# Patient Record
Sex: FEMALE | Race: WHITE
[De-identification: names, ages, dates, MRNs, and addresses within clinical notes are randomized per-mention and may not be internally consistent; named-entity substitution may affect disease eponyms.]

---

## 2017-05-22 ENCOUNTER — HOSPITAL ENCOUNTER (OUTPATIENT)
Dept: HOSPITAL 84 - D.MAMMO | Age: 77
End: 2017-05-22
Attending: INTERNAL MEDICINE
Payer: MEDICARE

## 2017-05-22 DIAGNOSIS — Z12.31: Primary | ICD-10-CM

## 2018-05-29 ENCOUNTER — HOSPITAL ENCOUNTER (OUTPATIENT)
Dept: HOSPITAL 84 - D.MAMMO | Age: 78
Discharge: HOME | End: 2018-05-29
Attending: INTERNAL MEDICINE
Payer: MEDICARE

## 2018-05-29 DIAGNOSIS — Z12.31: Primary | ICD-10-CM

## 2019-12-16 ENCOUNTER — HOSPITAL ENCOUNTER (OUTPATIENT)
Dept: HOSPITAL 84 - D.CT | Age: 79
Discharge: HOME | End: 2019-12-16
Attending: INTERNAL MEDICINE
Payer: MEDICARE

## 2019-12-16 DIAGNOSIS — R01.1: ICD-10-CM

## 2019-12-16 DIAGNOSIS — I70.213: Primary | ICD-10-CM

## 2019-12-16 DIAGNOSIS — R60.0: ICD-10-CM

## 2019-12-18 NOTE — EC
PATIENT:MTAILDE BRADY               DATE OF SERVICE: 12/16/19
SEX: F                                  MEDICAL RECORD: [id number]
DATE OF BIRTH: [birth year]                        LOCATION:D.CT           
AGE OF PATIENT: 79                             ADMISSION DATE: 12/16/19
 
REFERRING PHYSICIAN:                               
 
INTERPRETING PHYSICIAN: CARLOS ROCHA MD          
 
 
 
                             ECHOCARDIOGRAM REPORT
  ECHO CHARGES 4               ECHO COMPLETE                 Date: 12/16/19
 
 
 
CLINICAL DIAGNOSIS: HEART MURMUR                  
 
                         ECHOCARDIOGRAPHIC MEASUREMENTS
      (adult normal given)
   AC root (d.<3.7cm) 3.3  cm   LV Septum d (<1.2 cm> 1.3  cm
      Valve Excursion 1.6  cm     LV Septum (systole) 1.6  cm
Left Atria (s.<4.0cm> 3.7  cm          LVPW d(<1.2cm) 1.6  cm
        RV (d.<2.3cm) 3.7  cm           LVPW (sytole) 1.9  cm
  LV diastole(<5.6CM) 4.1  cm       MV E-F(>70mm/sec)      cm
           LV systole 2.5  cm           LVOT Diameter 1.6  cm
       MV exc.(>10mm) 1.1  cm
Est.ejection fraction (50-75%)     %
 
   DOPPLER:
     LVIT      cm/sec A 91.0 cm/sec E 59.0  cm/sec
       LA      cm/sec      RVSP 28   mmHg
     LVOT 89   cm/sec   AOP1/2T      m/s
  Asc. Ao 120  cm/sec
     RVOT 57   cm/sec
       RA      cm/sec
       PA 89   cm/sec
 AV Gradient Peak 5.72 mmHg  AV Mean 3.15 mmHg  AV Area 1.9  cm
 MV Gradient Peak 4.76 mmHg  MV Mean 2.10 mmHg  MV Area      cm
   COMMENTS:                                              
 
 
 Cardiac Sonographer: 2               MALINA LORENZ              
      Cardiologist: 3          Dr. Collins             
             TAPE# PACS           
                                       Pericardial Effusion N                        
 
 
DATE OF SERVICE:  
 
Adequate 2-D echo, Color-Flow and Spectral Doppler, and M-mode
 
LVH is present.  LV internal dimension is normal.  Wall motion is normal.  EF is
greater than or equal to 55%.  Aortic valve is tricuspid.  No evidence of
stenosis by Doppler interrogation.  Left atrium is normal.  Mitral valve shows
no prolapse.  Trace MR.  Right-sided chambers are grossly normal.  Trace TR.
 
TRANSINT:[id number] Voice Confirmation ID: [id number] DOCUMENT ID: [id number]
 
 
 
ECHOCARDIOGRAM REPORT                          [id number]    MATILDE BRADY,CARLOS ZAMBRANO MD          
 
 
 
Electronically Signed by CARLOS ROCHA on 12/18/19 at 0854
 
 
 
 
 
 
 
 
 
 
 
 
 
 
 
 
 
 
 
 
 
 
 
 
 
 
 
 
 
 
 
 
 
 
 
 
 
 
 
 
CC:                                                             [id number]
DICTATION DATE: 12/17/19 1311     :     12/17/19 1913      DEP CLI 
                                                                      12/16/19
[facility]                                          
[address] [place] [address]

## 2020-01-06 ENCOUNTER — HOSPITAL ENCOUNTER (OUTPATIENT)
Dept: HOSPITAL 84 - D.CATH | Age: 80
Discharge: HOME | End: 2020-01-06
Attending: INTERNAL MEDICINE
Payer: MEDICARE

## 2020-01-06 VITALS — SYSTOLIC BLOOD PRESSURE: 139 MMHG | DIASTOLIC BLOOD PRESSURE: 79 MMHG

## 2020-01-06 VITALS — BODY MASS INDEX: 22.13 KG/M2 | WEIGHT: 120.25 LBS | HEIGHT: 62 IN

## 2020-01-06 DIAGNOSIS — I10: ICD-10-CM

## 2020-01-06 DIAGNOSIS — I73.9: Primary | ICD-10-CM

## 2020-01-06 DIAGNOSIS — E78.5: ICD-10-CM

## 2020-01-06 LAB
ANION GAP SERPL CALC-SCNC: 11.4 MMOL/L (ref 8–16)
BASOPHILS NFR BLD AUTO: 0.3 % (ref 0–2)
BUN SERPL-MCNC: 23 MG/DL (ref 7–18)
CALCIUM SERPL-MCNC: 8.8 MG/DL (ref 8.5–10.1)
CHLORIDE SERPL-SCNC: 103 MMOL/L (ref 98–107)
CO2 SERPL-SCNC: 31.7 MMOL/L (ref 21–32)
CREAT SERPL-MCNC: 0.6 MG/DL (ref 0.6–1.3)
EOSINOPHIL NFR BLD: 1.4 % (ref 0–7)
ERYTHROCYTE [DISTWIDTH] IN BLOOD BY AUTOMATED COUNT: 12 % (ref 11.5–14.5)
GLUCOSE SERPL-MCNC: 93 MG/DL (ref 74–106)
HCT VFR BLD CALC: 41.6 % (ref 36–48)
HGB BLD-MCNC: 14.7 G/DL (ref 12–16)
IMM GRANULOCYTES NFR BLD: 0.5 % (ref 0–5)
LYMPHOCYTES NFR BLD AUTO: 21.8 % (ref 15–50)
MCH RBC QN AUTO: 33.6 PG (ref 26–34)
MCHC RBC AUTO-ENTMCNC: 35.3 G/DL (ref 31–37)
MCV RBC: 95.2 FL (ref 80–100)
MONOCYTES NFR BLD: 9.9 % (ref 2–11)
NEUTROPHILS NFR BLD AUTO: 66.1 % (ref 40–80)
OSMOLALITY SERPL CALC.SUM OF ELEC: 288 MOSM/KG (ref 275–300)
PLATELET # BLD: 209 10X3/UL (ref 130–400)
PMV BLD AUTO: 9.3 FL (ref 7.4–10.4)
POTASSIUM SERPL-SCNC: 3.1 MMOL/L (ref 3.5–5.1)
RBC # BLD AUTO: 4.37 10X6/UL (ref 4–5.4)
SODIUM SERPL-SCNC: 143 MMOL/L (ref 136–145)
WBC # BLD AUTO: 6.6 10X3/UL (ref 4.8–10.8)

## 2020-01-06 NOTE — NUR
RIGHT GROIN DRESSING C/D/I. NO S/S OF HEMATOMA NOTED. PT ON BEDPAN.
VOIDED APPROX 150cc OF YELLOW URINE. PT REFUSED TO LAY FLAT. SHE
STATES "I CAN'T PEE LAYING FLAT!!" AND PROCEEDED TO PROP HERSELF UP
ON HER ELBOWS TO VOID. I INSTRUCTED HER ON THE NEED TO CONTINUE TO
LAY FLAT, BUT SHE REFUSED. EVAN-CARE GIVEN AND RIGHT GROIN CHECKED
AFTER PT HAD SAT UP SLIGHTLY. DRESSING WAS C/D/I. NO S/S OF HEMATOMA
NOTED. SHE STATES THAT HER BACK HAS STARTED HURTING AND REPORTS ITS
HER CHRONIC PAIN AND GIVES IT A 8/10 ON PAIN SCALE. WILL GIVE PAIN
MEDICATION AS ORDERED AND CONTINUE TO MONITOR.

## 2020-01-06 NOTE — NUR
PT SET UP WITH DRINK AND SANDWICH. ABLE TO FEED SELF. DENIES NAUSEA.
AWAKE AND ALERT AT THIS TIME. PT IN [place] POSITION BUT BED IS IN
REVERSE [place] POSITION AT THIS TIME. VSS. CALL LIGHT WITHIN
REACH. PT WATCHING TELEVISION.

## 2020-01-06 NOTE — NUR
RIGHT GROIN DRESSING C/D/I. NO S/S OF HEMATOMA NOTED. PIV D/C'D WITH
CATH TIP INTACT. TOLERATED WELL. PT UP AND DRESSED WITH MINIMAL
ASSISTANCE.

## 2020-01-06 NOTE — NUR
RIGHT GROIN DRESSING C/D/I. NO S/S OF HEMATOMA NOTED. CALL LIGHT
WITHIN REACH. VSS AT THIS TIME. PT DENIES NAUSEA/PAIN.

## 2020-01-06 NOTE — HEMODYNAMI
PATIENT:MATILDE BRADY                                MEDICAL RECORD: [id number]
: 36                                            [facility]ATION:[facility]          
ACCT# [id number]                                       ADMISSION DATE: 20
 
 
 Generatedon:202015:03
Patient name: MATILDE BRADY Patient #: [id number] Visit #: [id number] SSN: YOB: 1940 Date
of study: 2020
Page: Of
Hemodynamic Procedure Report
****************************
Patient Data
Patient Demographics
Procedure consent was obtained
First Name: MATILDE         Gender: Female
Last Name: CAITLYN          : 1940
Middle Initial: J           Age: 79 year(s)
Patient #: [id number]       Race: Unknown
Visit #: [id number]
Accession #:
[id number]
Additional ID: [id number]
Contact details
Address: [address] [place] [address]    Phone: [phone]
STREET
State: [place]
City: [place]
Zip code: [address]
Past Medical History
Allergies
Allergen        Reaction        Date         Comments
Reported
Other allergy                   2020     CORTISONE
Admission
Admission Data
Admission Date: 2020    Admission Time: 11:29
Height (in.): 62            BSA: 1.54 (m2)
Height (cm.): 157.48        BMI: 22.13 (kg/m2)
Weight (lbs.): 121
Weight (kg.): 54.88
Lab Results
Lab Result Date: 2020   Lab Result Time: 0:00
Biochemistry
Name         Units    Result                Min      Max
BUN          mg/dl    23       --(----)-*   7        18
Creatinine   mg/dl    0.6      --(*---)--   0.6      1.3
eGFR         ml/min   90       --(*---)--   90       120
NONAFRICAN
CBC
Name         Units    Result                Min      Max
Hematocrit   %        41.6     -*(----)--   42       54
Hemoglobin   g/dl     14.7     --(-*--)--   13.5     17.5
Procedure
Procedure Types
Cath Procedure
Diagnostic Procedure
Sedation Charges
Moderate Sedation up to 45 minutes
 
PCI Procedure
Hemochron ACT Test
Peripheral Cath Diagnostic Procedure
Cath Lab Peripheral Procedures
AFRO (Diagnostic)
Peripheral vascular Intervention
Stent
Stent-Fem/Popw/plasty
Procedure Description
Procedure Date
Procedure Date: 2020
Procedure Start Time: 14:05
Procedure End Time: 15:01
Procedure Staff
Name                            Function
Espinoza Jimenes MD              Performing Physician
Lisette Nugent RN                  Nurse
Diya Hanna RT              Scrub
Verónica Houtson RT                Monitor
Brissanay Vanegas RT               Monitor
Procedure Data
Cath Procedure
Fluoroscopy
Diagnostic fluoroscopy      Total fluoroscopy Time:
time: 11.7 min              11.7 min
Diagnostic fluoroscopy      Total fluoroscopy dose: 208
dose: 208 mGy               mGy
Contrast Material
Contrast Material Type                       Amount (ml)
Isovue 370                                   113
Entry Location
Entry     Primary  Successful  Side  Size  Upsize Upsize Entry    Closure Succes
sful  Closure
Location                             (Fr)  1 (Fr) 2 (Fr) Remarks  Device        
      Remarks
Femoral                        Right 5 Fr  6 Fr                   Exoseal
artery                                     Long
Estimated blood loss: 10 ml
Diagnostic catheters
Device Type               Used For           End Catheter
Placement
DIAGNOSTIC UF 5Fr         Procedure
catheter ([id number])
Procedure Complications
No complications
Procedure Medications
Medication           Administration Route Dosage
Oxygen               etCO2 Nasal cannula  2 l/min
Lidocaine 2%         added to field       20
Heparin Flush Bag    added to field       2 bags
(1000units/500ml NS)
0.9% NaCl            I.V.                 100 ml/hr
Versed               I.V.                 2 mg
Fentanyl             I.V.                 50 mcg
Versed               I.V.                 1 mg
Fentanyl             I.V.                 50 mcg
Heparin Bolus        I.V.                 5000 units
Versed               I.V.                 1 mg
Fentanyl             I.V.                 100 mcg
Versed               I.V.                 2 mg
 
Fentanyl             I.V.                 100 mcg
Heparin Bolus        I.V.                 2000 units
Hemodynamics
Rest
BSA: 1.54 (m2) HGB: 14.7 (g/dl) O2 Consumption: Estimated: 136.35 (ml/min) O2 Co
nsumption indexed:
Estimated:88.54 (ml/min/m) Heart Rate: 67 (bpm)
Snapshots
Pre Cath      Intra         NCS           Post Cath
Vital Signs
Time     Heart  Resp   SPO2 etCO2   NIBP (mmHg) Rhythm  Pain    Sedation
Rate   (ipm)  (%)  (mmHg)                      Status  Level
(bpm)
13:50:06 73     18     97   0       170/94(123) NSR     0 (11)  10(A)
, No
pain
13:54:28 68     14     97   27.7    145/79(107) NSR     0 (11)  10(A)
, No
pain
13:58:44 68     18     96   0       134/74(117) NSR     0 (11)  10(A)
, No
pain
14:03:00 72     15     93   0       131/68(89)  NSR     0 (11)  10(A)
, No
pain
14:07:16 72     19     100  24.7    138/66(111) NSR     0 (11)  10(A)
, No
pain
14:11:32 78     17     94   0       125/63(102) NSR     0 (11)  9(A)
, No
pain
14:15:44 78     17     97   0       127/66(97)  NSR     0 (11)  9(A)
, No
pain
14:19:58 80     18     98   0       130/65(85)  NSR     0 (11)  9(A)
, No
pain
14:24:14 79     19     96   0       126/64(106) NSR     0 (11)  9(A)
, No
pain
14:28:24 81     20     100  41.9    135/78(93)  NSR     0 (11)  10(A)
, No
pain
14:32:40 78     17     100  12.7    128/69(91)  NSR     0 (11)  10(A)
, No
pain
14:36:52 77     11     100  29.9    123/69(94)  NSR     0 (11)  10(A)
, No
pain
14:41:02 78     17     100  0       132/69(90)  NSR     0 (11)  10(A)
, No
pain
14:45:18 77     15     100  0       150/66(95)  NSR     0 (11)  10(A)
, No
pain
14:49:38 77     16     98   0       123/61(82)  NSR     0 (11)  10(A)
, No
pain
14:53:48 81     20     99   24.7    136/69(113) NSR     0 (11)  10(A)
, No
 
pain
14:58:00 82     14     99   44.2    138/81(105) NSR     0 (11)  10(A)
, No
pain
15:02:08 83     25     93   9.7     153/92(111) NSR     0 (11)  10(A)
, No
pain
Medications
Time     Medication       Route   Dose  Verified Delivered Reason          Notes
    Effectiveness
by       by
13:50:31 Oxygen           etCO2   2     Espinoza Knox    used for
Nasal   l/min St Sunny Nugent RN   procedure
cannula       MD
13:56:41 Lidocaine 2%     added   20ml  Espinoza Doran   for local
to      vial  [facility]   anesthetic
field         MD       MD
13:56:48 Heparin Flush    added   2     Espinoza Doran   used for
Bag              to      bags  [facility]   procedure
(1000units/500ml field MD NEWTON
NS)
13:57:00 0.9% NaCl        I.V.    100   Espinoza Knox    Per physician
ml/hr St Sunny Nugent RN, MD
14:02:47 Versed           I.V.    2 mg  Espinoza  Buffie    for sedation
[facility] Sunny Nugent RN, MD
14:02:53 Fentanyl         I.V.    50    Espinoza  Buffie    for sedation
mcg   St Sunny Nugent RN, MD
14:07:45 Versed           I.V.    1 mg  Espinoza  Buffie    for sedation
[facility] Sunny Nugent RN, MD
14:07:49 Fentanyl         I.V.    50    Espinoza  Buffie    for sedation
[facility]   St Sunny Nugent RN, MD
14:10:36 Heparin Bolus    I.V.    5000  Espinoza  Buffie    for             verif
ied
units St Sunny Nugent RN   anticoagulation with dr MD gordon
14:15:20 Versed           I.V.    2 mg  Espinoza  Buffie    for sedation
St Sunny Nugent RN, MD
14:29:59 Versed           I.V.    1 mg  Espinoza  Buffie    for sedation
St Sunny Nugent RN, MD
14:30:02 Fentanyl         I.V.    100   Espinoza  Buffie    for sedation
annie Olguin RN, MD
14:44:22 Fentanyl         I.V.    100   Espinoza  Buffie    for sedation
[facility]   St Sunny Nugent RN, MD
14:46:32 Heparin Bolus    I.V.    2000  Espinoza  Buffie    for             verif
ied
units St Sunny Nugent RN   anticoagulation with dr MD gordon
Procedure Log
Time     Note
18:40:47 Sheath removed intact; hemostasis achieved with
Exoseal to the Right Femoral artery.
 
13:29:47 Informed consent obtained and on chart
13:29:54 Procedure Status Elective Heart Cath (OP).
13:29:55 Time tracking: Regular hours (M-F 7:00 - 5:00)
13:29:58 Plan of Care:Hemodynamics will remain stable., Cardiac
rhythm will remain stable., Comfort level will be
maintained., Respiratory function will remain
adequate., Patient/ family verbilizes understanding of
procedure., Procedure tolerated without complication.,
Recovers from procedure without complications..
13:30:12 H&P Date Dictated: 2019 Within 30 days and on
chart., H&P Addendum completed by physician on day of
procedure. (MUST COMPLETE FOR ALL OUTPATIENTS).
13:30:53 Patient allergic to Other allergyCORTISONE
13:31:52 Patient Weight : 121 lbs
13:31:56 Patient Height : 62 inches
13:32:37 Lisette Nugent RN sent for patient. Start room use.
13:37:00 Lab Result : BUN 23 mg/dl
13:37:00 Lab Result : eGFR NONAFRICAN 90 ml/min
13:37:00 Lab Result : Creatinine 0.6 mg/dl
13:37:00 Lab Result : Hemoglobin 14.7 g/dl
13:37:00 Lab Result : Hematocrit 41.6 %
13:43:10 Patient received from Pre/Post Procedure Room to CCL 1
Alert and oriented. Tansferred to table in Supine
position.
13:43:12 Warm blankets applied, and carolina hugger turned on for
patient comfort.
13:43:13 Correct patient and procedure confirmed by team.
13:43:15 ECG and BP/O2 sat monitors applied to patient.
13:43:19 Pre-procedure instructions explained to patient.
13:43:20 Pre-op teaching completed and patient verbalized
understanding.
13:43:22 Family in waiting room.
13:43:24 Patient NPO since Midnight.
13:43:38 Stress Test: no; N/A ?
13:47:50 Is the patient allergic to Iodine/contrast media? No.
13:47:53 Is patient on blood thinner?No
13:47:55 Patient diabetic? No.
13:47:57 If diabetic: On Metformin? N/A
13:48:00 Patient not pregnant. Patient is over age 55.
13:48:01 ----Pre-sedation anethsthesia assessment.----
13:48:05 Previous problem with sedation/anesthesia? No ?
13:48:06 Snore? No
13:48:07 Sleep apnea? No
13:48:09 Deviated septum? No
13:48:10 Opens mouth fully? Yes
13:48:11 Sticks out tongue? Yes
13:48:13 Airway obstruction? No ?
13:48:15 Dentures? No ?
13:48:19 Pre procedure: right dorsailis pedis pulse 1+
Palpable, but thready & weak; easily obliterated
13:48:28 Patient pain scale 1/10 left leg pain.
13:48:41 Bilateral groins area was prepped with chlora-prep and
draped in sterile fashion
13:48:42 Alarms reviewed by R. N.
13:48:43 Sharps counted by scrub and verified by R.N.
13:48:51 Lab results completed and on chart.
13:48:58 Vital chart was started
13:49:01 Full Disclosure recording started
13:49:29 IV patent on arrival in left antecubital with 0.9%
NaCl at O.
 
13:50:31 Oxygen 2 l/min etCO2 Nasal cannula was administered by
Lisette Nugent RN; used for procedure; Verbal order read
back and verified.
13:56:41 Lidocaine 2% 20ml vial added to field was administered
by Espinoza Jimenes MD; for local anesthetic; Verbal
order read back and verified.
13:56:48 Heparin Flush Bag (1000units/500ml NS) 2 bags added to
field was administered by Espinoza Jimenes MD; used for
procedure; Verbal order read back and verified.
13:57:00 0.9% NaCl 100 ml/hr I.V. was administered by Lisette Nugent RN; Per physician; Verbal order read back and
verified.
13:57:09 Baseline sample Acquired.
13:57:13 Rhythm: sinus rhythm
13:59:04 Use device set Femoral Dx
13:59:06 ACIST Syringe (61107) opened to sterile field.
13:59:07 Bag Decanter (2002S) opened to sterile field.
13:59:08 Medline Cath Pack ([id number]) opened to sterile field.
13:59:10 ACIST Hand Control (60395) opened to sterile field.
13:59:10 ACIST Manifold (86786) opened to sterile field.
13:59:17 SHEATH 5FR [place] (ZZJ140) opened to sterile field.
13:59:18 EMERALD Guide Wire (288-476) opened to sterile field.
14:02:07 --------ALL STOP TIME OUT------
14:02:08 Final Timeout: patient, procedure, and site verified
with staff and physician. All members of the team are
in agreement.
14:02:10 Bilateral groins site verified by team.
14:02:14 Fire Safety Assessment: A--An alcohol-based skin
anteseptic being used preoperatively., C--Open oxygen
or nitrous oxide is being used., D--An ESU, laser, or
fiber-optic light is being used.
14:02:17 Physical assessment completed. ASA score P 2 - A
patient with mild systemic disease as per Espinoza Jimenes MD.
14:02:24 1) 90+ Normal kidney functon but urine findings or
structural abnormalities or genetic trait point to
kidney disease.
14:02:28 Maximum allowable contrast dose (3.7 X eGFR X 0.75)250
ml.
14:02:32 Sedation plan: IV Moderate Sedation Medication:Versed,
Fentanyl
14:02:47 Versed 2 mg I.V. was administered by Lisette Nugent RN;
for sedation; Verbal order read back and verified.
14:02:53 Fentanyl 50 mcg I.V. was administered by Lisette Nugent
RN; for sedation; Verbal order read back and verified.
14:04:34 Procedure started.
14:05:14 Local anesthetic to right femoral artery with
Lidocaine 2% by Espinoza Jimenes MD.**INITIAL ACCESS
ONLY**
14:06:29 A 5 Fr sheath was inserted into the Right Femoral
artery
14:06:45 A DIAGNOSTIC UF 5Fr catheter ([id number]) was advanced
over the wire and used for Procedure.
14:07:45 Versed 1 mg I.V. was administered by Lisette Nugent RN;
for sedation; Verbal order read back and verified.
14:07:47 Abdominal angiogram w/ runoff was performed.
14:07:49 Fentanyl 50 mcg I.V. was administered by Lisette Nugent
RN; for sedation; Verbal order read back and verified.
14:09:56 Proceeding to intervention.
14:10:36 Heparin Bolus 5000 units I.V. was administered by
 
Lisette Nugent RN; for anticoagulation; verified with dr gordon Verbal order read back and verified.
14:11:36 Catheter exchanged over wire.
14:11:43 SHEATH 6FR Destination (RSR01) opened to sterile
field.
14:12:00 Sheath upsized to a 6 Fr Long.
14:12:28 TORQUE DEVICE PLASTIC .038 ( TD01) opened to sterile
field.
14:15:20 Versed 2 mg I.V. was administered by Lisette Nugent RN;
for sedation; Verbal order read back and verified.
14:16:58 6 FR DESTINATION ADVANCED.
14:19:25 GLIDE WIRE Super Stiff Angled 260cm ([id number]) opened to
sterile field.
14:19:36 WIRE ADVANCED.
14:24:17 INFLATOR Merit Rayk (OY6021) opened to sterile
field.
14:25:57 Wire advanced across lesion.
14:28:09 Inflate balloon Inflation number: 1 A POWERFLEX PRO
5.0 X 100 X 135 balloon ([id number]) was prepped and
advanced across the Mid Superficial Femoral, Left ,
then inflated to 10 CAITY for 0:00 (min:sec) .
14:29:01 Inflation number: 2 The POWERFLEX PRO 5.0 X 100 X 135
balloon ([id number]) was reinflated across the Mid
Superficial Femoral, Left , to 10 CAITY for 0:00
(min:sec) .
14:29:45 Inflation number: 3 The POWERFLEX PRO 5.0 X 100 X 135
balloon ([id number]) was reinflated across the Mid
Superficial Femoral, Left , to 10 CAITY for 0:00
(min:sec) .
14:29:59 Versed 1 mg I.V. was administered by Lisette Nugent RN;
for sedation; Verbal order read back and verified.
14:30:02 Fentanyl 100 mcg I.V. was administered by Lisette Nugent
RN; for sedation; Verbal order read back and verified.
14:30:44 Balloon removed over the wire.
14:39:49 SMART Flex 5 X 120 X 120 stent ([id number]) was
deployed across Mid Superficial Femoral, Left .
14:41:00 Stent catheter was removed intact over wire.
14:43:26 SMART Flex 5 X 100 X 120 stent ([id number]) was
deployed across Mid Superficial Femoral, Left .
14:44:22 Fentanyl 100 mcg I.V. was administered by Lisette Nugent
RN; for sedation; Verbal order read back and verified.
14:44:38 Stent catheter was removed intact over wire.
14:45:52 Balloon re-inserted over wire.
14:46:20 Inflation number: 4 The POWERFLEX PRO 5.0 X 100 X 135
balloon ([id number]) was reinflated across the Mid
Superficial Femoral, Left , to 12 CAITY for 0:00
(min:sec) .
14:46:32 Heparin Bolus 2000 units I.V. was administered by
Lisette Nugent RN; for anticoagulation; verified with dr gordon Verbal order read back and verified.
14:47:03 Inflation number: 5 The POWERFLEX PRO 5.0 X 100 X 135
balloon ([id number]) was reinflated across the Mid
Superficial Femoral, Left , to 12 CAITY for 0:00
(min:sec) .
14:47:35 Inflation number: 6 The POWERFLEX PRO 5.0 X 100 X 135
balloon ([id number]) was reinflated across the Mid
Superficial Femoral, Left , to 12 CAITY for 0:00
(min:sec) .
14:48:17 Balloon removed over the wire.
14:48:38 Wire removed.
 
14:48:38 Guide catheter removed.
14:49:02 EXOSEAL 6Fr () opened to sterile field.
14:49:45 SHEATH 6FR [place] (PWO384) opened to sterile field.
14:51:04 Procedure ended.(Physican Out)
14:51:16 Fluoroscopy time 11.70 minutes.
14:51:20 Fluoroscopy dose: 208 mGy
14:51:20 Flurop Dose total: 208
14:51:26 Dose Area Product 66244 mGy/cm.
14:51:31 Contrast amount:Isovue 370 113ml.
14:51:34 Maximum allowable dose exceeded? No.
14:51:35 Sharps counted by scrub and verified by R.N.
14:51:42 Post-op/insertion site Right Femoral artery dressed
using a 4 x 4 and Tegaderm.
14:51:50 Post right femoral artery:stable, soft, clean and dry
14:51:51 Post Procedure Pulses reassessed and unchanged
14:51:56 Post procedure: right dorsailis pedis pulse 1+
Palpable, but thready & weak; easily obliterated.
14:52:00 Post-procedure physical assessment completed. ASA
score P 2 - A patient with mild systemic disease as
per Espinoza Jimenes MD.
14:52:03 Post procedure rhythm: unchanged.
14:52:07 Estimated blood loss: 10 ml
14:52:09 Post procedure instruction explained to
patient.Patient verbalizes understanding.
14:52:10 Patient needs reinforcement of post procedure
teaching.
14:55:12 Procedure type changed to Cath procedure, Diagnostic
procedure, Sedation Charges, Moderate Sedation up to
45 minutes, PCI procedure, Hemochron ACT Test,
Peripheral Cath Diagnostic Procedure, Cath Lab
Peripheral Procedures, AFRO (Diagnostic), Peripheral
vascular Intervention, Stent, Stent-Fem/Popw/plasty
14:55:19 ACT drawn and resulted at 371 seconds. (normal
therapeutic range 180-240 seconds).
14:55:31 Procedure Complication : No complications
14:57:57 Procedure and supply charges have been captured,
reviewed, submitted and are correct.
14:58:02 Operative report dictated upon procedure completion.
14:58:03 Operative report dictated upon procedure completion.
14:58:04 See physician's report for complete and final results.
14:58:06 Report given to Pre/Post Procedure Room.
14:58:10 Patient transfered to Pre/Post Procedure Room with
Stretcher.
14:58:34 AFRO Findings: PVD: PTA performed (see procedure
notes)
15:01:53 Vital chart was stopped
15::58 Procedure ended.
15::58 Full Disclosure recording stopped
15:02:39 **ACC-PCI Only** Patient was given prescriptions, or
instructed by Espinoza Jimenes MD to start/continue the
following medications upon discharge: Plavix
15:02:41 End room use (Document Last)
15:03:11 End room use (Document Last)
15:03:27 End room use (Document Last)
Intervention Summary
Intervention Notes
Time     ActionType  Lesion and  Equipment   Action#  Pressure  Duration
Attributes  Used
14:28:09 Inflate     Mid         POWERFLEX   1        10        00:00
balloon     Superficial PRO 5.0 X
 
Femoral,    100 X 135
Left        balloon
([id number])
14:29:01 Reinflate   Mid         POWERFLEX   2        10        00:00
balloon     Superficial PRO 5.0 X
Femoral,    100 X 135
Left        balloon
([id number])
14:29:45 Reinflate   Mid         POWERFLEX   3        10        00:00
balloon     Superficial PRO 5.0 X
Femoral,    100 X 135
Left        balloon
([id number])
14:39:49 Deploy self Mid         SMART Flex  1
expanding   Superficial 5 X 120 X
stent       Femoral,    120 stent
Left        ([id number])
14:43:26 Deploy self Mid         SMART Flex  1
expanding   Superficial 5 X 100 X
stent       Femoral,    120 stent
Left        ([id number])
14:46:20 Reinflate   Mid         POWERFLEX   4        12        00:00
balloon     Superficial PRO 5.0 X
Femoral,    100 X 135
Left        balloon
([id number])
14:47:03 Reinflate   Mid         POWERFLEX   5        12        00:00
balloon     Superficial PRO 5.0 X
Femoral,    100 X 135
Left        balloon
([id number])
14:47:35 Reinflate   Mid         POWERFLEX   6        12        00:00
balloon     Superficial PRO 5.0 X
Femoral,    100 X 135
Left        balloon
([id number])
Device Usage
Item Name   Manufacture  Quantity  Catalog    Hospital Part    Current Minimal L
ot# /
Number     Charge   Number  Stock   Stock   Serial#
Code
ACIST       Acist        1         93794      231347   078713  106183  20
Syringe     Medical
(64606)     Systems Inc
Bag         Microtek     1         S      883899   32915   697957  5
Decanter    Medical Inc.
()
Medline     Medline      1         [id number]  335324   63782   589224  5
Cath Pack
([id number])
ACIST Hand  Acist        1         00126      229045   701697  336854  5
Control     Medical
(19620)     Systems Inc
ACIST       Acist        1         10674      841412   812986  310972  5
Manifold    Medical
(24543)     Systems Inc
SHEATH 5FR  Terumo       1         [id number]     952883   565989  180799  5
[place]
(DDR572)
EMERALD     Cardinal     1         502-455    990881   788006  537929  5
 
Guide Wire  Health
(502-455)
DIAGNOSTIC  Cardinal     1         305787N9   555461   588422  116157  10
UF 5Fr      Health
catheter
(031125D6)
SHEATH 6FR  Terumo       1         RSR01      862164   55439   035316  5
Destination
(RSR01)
TORQUE      [facility]       1         TD01       359960   396167  271776  5
DEVICE      Scientific
PLASTIC
.038 (
TD01)
GLIDE WIRE  Terumo       1         [id number]     379595   520713  362515  5
Super Stiff
Angled
260cm
(CC8421)
INFLATOR    Merit        1         EZ0010     742063   074793  587899  15
[facility]       Medical
BasixCompak
(YB2854)
POWERFLEX   Cardinal     1         [id number]   260355   614975  031256  5
PRO 5.0 X   Health
100 X 135
balloon
([id number])
SMART Flex  Cardinal     1         [id number]  086357   738228  066879  0
5 X 120 X   Health
120 stent
([id number])
SMART Flex  Cardinal     1         [id number]  419475   638723  635687  0
5 X 100 X   Health
120 stent
([id number])
EXOSEAL 6Fr Cardinal     1               394454   054249  858730  10
()     Health
SHEATH 6FR  Terumo       1         [id number]     286335   570740  530925  40
[place]
(WSU479)
Signature Audit [place]
Stage           Time        Signature      Unsigned
Intra-Procedure 2020    Verónica Houston
3:03:11 PM  RT(R)
Intra-Procedure 2020    Lisette Nugent RN
3:03:27 PM
Intra-Procedure 2020    Espinoza Lamb
3:03:46 PM  Sunny NEWTON
 
 
 
 
 
 
[facility]                                          
[address] [place] [address]

## 2020-01-06 NOTE — NUR
PT'S RIDE ARRIVED. PT TAKEN OUT TO VEHICLE BY WHEELCHAIR. NO S/S OF
DISTRESS NOTED. ALL BELONGINGS AND PAPERWORK IN HAND. THIS INCLUDES
PHONE, GLASSES, PURSE, CLOTHING, AND DISCHARGE PAPEROWRK.

## 2020-01-06 NOTE — NUR
RIGHT GROIN DRESSING C/D/I. NO S/S OF HEMATOMA NOTED. RIGHT PEDAL
PULSES WITH DOPPLER. BILATERAL LOWER EXT WARM TO TOUCH. PINK IN COLOR
WITH CAP REFILL <3 SECS. PT'S HEAD OF BED INC TO 30 DEGREES.
TOLERATED WELL. REPORTS HER BACK PAIN HAS IMPROVED SLIGHTLY AND RATES
IT A 6/10 AT THIS TIME. CALL LIGHT WITHIN REACH. VSS. NO NEEDS AT
THIS TIME.

## 2020-01-06 NOTE — NUR
DISCUSSED DISCHARGE INSTRUCTIONS WITH PT. SHE VOICED UNDERSTANDING.
PT AMBULATING IN ROOM WITH WALKER. STEADY GAIT NOTED. RIGHT GROIN
DRESSING C/D/I. NO S/S OF HEMATOMA NOTED.

## 2020-01-06 NOTE — NUR
PT ARRIVED BY STRETCHER. PLACED ON MONITORS. ASSESSMENT COMPLETED.
VSS. CALL LIGHT WITHIN REACH. NO FAMILY AT BEDSIDE AT THIS TIME.

## 2020-01-06 NOTE — NUR
PT RESTING COMFORTABLY. VSS. RIGHT GROIN DRESSING C/D/I. NO S/S OF
HEMATOMA NOTED. CALL LIGHT WITHIN REACH. DENIES NAUSEA AT THIS TIME.

## 2020-01-08 NOTE — OP
PATIENT NAME:  MATILDE BRADY                         MEDICAL RECORD: [id number]
:36                                             LOCATION:D.CAT          
                                                         ADMISSION DATE:        
SURGEON:  CARLOS ROCHA MD          
 
 
DATE OF OPERATION:  2020
 
PROCEDURE:  AFRO.  PTCA stent to the left SFA.
 
CATHETERS USED:  A 5-Indonesian sheath, LACY catheter.
 
The procedure was well tolerated.  We proceeded immediately to PTA stenting of
the left SFA.
 
FINDINGS:  The LACY catheter was placed at the level of the renal arteries and
AFRO was performed.  This showed a markedly tortuous aorta, but no evidence of
dissection, no evidence of aneurysm.  Nonselective injection of the renal artery
showed no significant stenosis.
 
Left iliac system is very tortuous.  The superficial system common and deep free
of disease.  The left superficial femoral shows multiple areas beginning at the
mid portion down right before the trifurcation greater than 90%.
 
Right iliac system is again a very tortuous without significant stenosis.  The
right femoral system including deep and common are free of disease.  Right
superficial again has several areas of 80% stenosis right before the
trifurcation.
 
IMPRESSION:  Left is the most symptomatic, plan of intervention momentarily. 
Intervention of the right at later date.
 
DESCRIPTION OF PROCEDURE:
1.  After a long 6-Indonesian sheath was placed around the horn into the iliac
system.  The Glidewire placed down through all the lesions down this portion of
vessel.  Pre-deployment balloon used was a 5-0, 100 balloon.
2.  SMART stents were placed in the following fashion distally, a 5 x 120 SMART
stent was placed.
3.  A 100 x 5 SMART stent was placed.  These were then postdilated with the
previous balloon up to 14 atmospheres.  This shows excellent resolution of
multiple 90% stenosis.  No significant residual.  Good three-vessel runoff is
noted with good pulses distally.  Sheath closed with ExoSeal device.
 
PLAN:  Intervention of the right at a later date.
 
TRANSINT:[id number] Voice Confirmation ID: [id number] DOCUMENT ID: [id number]
                                           
                                           CARLOS ROCHA MD          
 
 
 
Electronically Signed by CARLOS ROCHA on 20 at 0909
CC:                                                             [id number]
DICTATION DATE: 20 1504     :     20 1816      DEP CLI 
                                                                      20
[facility]                                          
[address] [place] [address]

## 2020-01-17 ENCOUNTER — HOSPITAL ENCOUNTER (OUTPATIENT)
Dept: HOSPITAL 84 - D.US | Age: 80
Discharge: HOME | End: 2020-01-17
Attending: NURSE PRACTITIONER
Payer: MEDICARE

## 2020-01-17 VITALS — BODY MASS INDEX: 22 KG/M2

## 2020-01-17 DIAGNOSIS — I72.4: Primary | ICD-10-CM
